# Patient Record
Sex: FEMALE | Race: WHITE | NOT HISPANIC OR LATINO | URBAN - METROPOLITAN AREA
[De-identification: names, ages, dates, MRNs, and addresses within clinical notes are randomized per-mention and may not be internally consistent; named-entity substitution may affect disease eponyms.]

---

## 2017-03-24 ENCOUNTER — HOSPITAL ENCOUNTER (OUTPATIENT)
Dept: SLEEP MEDICINE | Facility: HOSPITAL | Age: 37
Discharge: HOME | End: 2017-03-24
Attending: PSYCHIATRY & NEUROLOGY | Admitting: PSYCHIATRY & NEUROLOGY

## 2020-01-15 ENCOUNTER — APPOINTMENT (OUTPATIENT)
Dept: WOMENS IMAGING | Facility: HOSPITAL | Age: 40
End: 2020-01-15

## 2020-01-15 PROCEDURE — 77067 SCR MAMMO BI INCL CAD: CPT | Performed by: RADIOLOGY

## 2020-01-15 PROCEDURE — 77063 BREAST TOMOSYNTHESIS BI: CPT | Performed by: RADIOLOGY

## 2020-12-02 ENCOUNTER — OFFICE VISIT (OUTPATIENT)
Dept: NEUROLOGY | Facility: CLINIC | Age: 40
End: 2020-12-02

## 2020-12-02 VITALS
TEMPERATURE: 96.8 F | WEIGHT: 293 LBS | DIASTOLIC BLOOD PRESSURE: 86 MMHG | SYSTOLIC BLOOD PRESSURE: 146 MMHG | HEART RATE: 114 BPM | HEIGHT: 65 IN | BODY MASS INDEX: 48.82 KG/M2

## 2020-12-02 DIAGNOSIS — E66.01 SEVERE OBESITY (BMI >= 40) (HCC): ICD-10-CM

## 2020-12-02 DIAGNOSIS — G47.33 OBSTRUCTIVE SLEEP APNEA: Primary | ICD-10-CM

## 2020-12-02 PROBLEM — J45.20 ASTHMA, MILD INTERMITTENT: Status: ACTIVE | Noted: 2020-12-02

## 2020-12-02 PROBLEM — Z83.3 FAMILY HISTORY OF DIABETES MELLITUS: Status: ACTIVE | Noted: 2020-12-02

## 2020-12-02 PROBLEM — R09.02 HYPOXIA: Status: ACTIVE | Noted: 2017-03-28

## 2020-12-02 PROBLEM — F41.9 ANXIETY DISORDER: Status: ACTIVE | Noted: 2020-12-02

## 2020-12-02 PROBLEM — M94.0 ACUTE COSTOCHONDRITIS: Status: ACTIVE | Noted: 2018-11-21

## 2020-12-02 PROBLEM — I10 HYPERTENSION: Status: ACTIVE | Noted: 2020-12-02

## 2020-12-02 PROBLEM — Z97.5 IUD (INTRAUTERINE DEVICE) IN PLACE: Status: ACTIVE | Noted: 2020-12-02

## 2020-12-02 PROBLEM — J30.2 SEASONAL ALLERGIES: Status: ACTIVE | Noted: 2020-12-02

## 2020-12-02 PROCEDURE — 99203 OFFICE O/P NEW LOW 30 MIN: CPT | Performed by: PSYCHIATRY & NEUROLOGY

## 2020-12-02 RX ORDER — ALPRAZOLAM 0.25 MG/1
TABLET ORAL
COMMUNITY
Start: 2020-10-06

## 2020-12-02 RX ORDER — ERGOCALCIFEROL 1.25 MG/1
CAPSULE ORAL
COMMUNITY
Start: 2020-09-18

## 2020-12-02 RX ORDER — BUDESONIDE AND FORMOTEROL FUMARATE DIHYDRATE 160; 4.5 UG/1; UG/1
2 AEROSOL RESPIRATORY (INHALATION)
COMMUNITY
Start: 2020-08-20

## 2020-12-02 RX ORDER — LOSARTAN POTASSIUM AND HYDROCHLOROTHIAZIDE 25; 100 MG/1; MG/1
1 TABLET ORAL DAILY
COMMUNITY
Start: 2017-03-06

## 2020-12-02 RX ORDER — CETIRIZINE HYDROCHLORIDE 10 MG/1
CAPSULE, LIQUID FILLED ORAL
COMMUNITY
Start: 2017-03-06

## 2020-12-02 RX ORDER — ROSUVASTATIN CALCIUM 10 MG/1
10 TABLET, COATED ORAL DAILY
COMMUNITY
Start: 2020-07-27 | End: 2021-07-27

## 2020-12-02 RX ORDER — MONTELUKAST SODIUM 10 MG/1
10 TABLET ORAL DAILY
COMMUNITY
Start: 2017-03-06

## 2020-12-02 RX ORDER — BUPROPION HYDROCHLORIDE 100 MG/1
100 TABLET ORAL 2 TIMES DAILY
COMMUNITY

## 2020-12-02 NOTE — PROGRESS NOTES
Sleep Medicine initial Consultation    Charlotte Mckenzie  : 1980  40 y.o. female   Date of Service: 2020  Referring provider: Self Referring    New sleep, previously seen in 2017    Patient c/o waking up during the night and spouse says she's been doing allot of snoring. Patient been having more fatigue more so in the afternoons.     Patient currently uses a FFM and goes through [employer] for supplies.     Sleep study was done in 2017 HST     On auto cpap 15-20. avg 7 min 100% compliance avg leak 29 min avg pressure 17.3    Obesity, BMI-53.7    There is no history of hypnagogic hallucinations, sleep paralysis or cataplexy.  .  The patient complains of Leg symptoms: leg jerking during sleep.     The patient complains of problems with insomnia:  frequent awakenings 2-3.      Sleep schedule: Bedtime:10-11 , gets out of bed at 7, sleep latency: few seconds, Gets about 6-8 hours of sleep.      EPWORTH SLEEPINESS SCALE  Sitting and reading  1  WatchingTV  1  Sitting, inactive, in a public place  0  As a passenger in a car for 1 hour w/o a break  2  Lying down to rest in the afternoon  2  Sitting and talking to someone  0  Sitting quietly after a lunch  1  In a car, while stopped for traffic or a light  0  Total 7          .  History reviewed. No pertinent past medical history.  History reviewed. No pertinent surgical history.  Current Outpatient Medications on File Prior to Visit   Medication Sig Dispense Refill   • ALPRAZolam (XANAX) 0.25 MG tablet TAKE ONE TABLET BY MOUTH THREE TIMES A DAY AS NEEDED FOR SLEEP OR ANXIETY **MAX DAILY AMOUNT 0.75 MG**     • budesonide-formoterol (SYMBICORT) 160-4.5 MCG/ACT inhaler Inhale 2 puffs.     • buPROPion (WELLBUTRIN) 100 MG tablet Take 100 mg by mouth 2 (Two) Times a Day.     • Cetirizine HCl (ZyrTEC Allergy) 10 MG capsule ZYRTEC ALLERGY 10 MG CAPS     • losartan-hydrochlorothiazide (HYZAAR) 100-25 MG per tablet Take 1 tablet by mouth Daily.     •  metFORMIN (GLUCOPHAGE) 500 MG tablet Take 500 mg by mouth.     • montelukast (SINGULAIR) 10 MG tablet Take 10 mg by mouth Daily.     • rosuvastatin (CRESTOR) 10 MG tablet Take 10 mg by mouth Daily.     • sertraline (Zoloft) 50 MG tablet ZOLOFT 50 MG TABS     • vitamin D (ERGOCALCIFEROL) 1.25 MG (31162 UT) capsule capsule TAKE ONE CAPSULE BY MOUTH ONCE WEEKLY       No current facility-administered medications on file prior to visit.      No Known Allergies  History reviewed. No pertinent family history.  Social History     Socioeconomic History   • Marital status:      Spouse name: Not on file   • Number of children: Not on file   • Years of education: Not on file   • Highest education level: Not on file   Tobacco Use   • Smoking status: Never Smoker   • Smokeless tobacco: Never Used   Substance and Sexual Activity   • Alcohol use: Yes     Comment: socially    • Drug use: Never   • Sexual activity: Yes     Partners: Male     Review of Systems   Constitutional: Positive for fatigue. Negative for appetite change.   HENT: Positive for ear pain. Negative for rhinorrhea and sinus pain.    Eyes: Negative for pain and itching.   Respiratory: Positive for apnea, shortness of breath and wheezing. Negative for cough.    Cardiovascular: Negative for chest pain and palpitations.   Gastrointestinal: Negative for constipation and diarrhea.   Endocrine: Negative for cold intolerance and heat intolerance.   Genitourinary: Positive for frequency. Negative for difficulty urinating.   Musculoskeletal: Positive for back pain. Negative for neck pain.   Allergic/Immunologic: Positive for environmental allergies.   Neurological: Negative for dizziness, tremors, seizures, syncope, facial asymmetry, speech difficulty, weakness, light-headedness, numbness and headaches.   Psychiatric/Behavioral: Negative for agitation and confusion.       I reviewed and addressed ROS entered by MA.    Patient examination:  Vitals:    12/02/20 1633    BP: 146/86   Pulse: 114   Temp: 96.8 °F (36 °C)    Body mass index is 53.72 kg/m².     Physical Exam  Vitals signs reviewed.   Constitutional:       Appearance: Normal appearance.   HENT:      Head: Normocephalic.      Nose: Nose normal. No congestion.      Mouth/Throat:      Mouth: Mucous membranes are moist.   Eyes:      Extraocular Movements: Extraocular movements intact.      Pupils: Pupils are equal, round, and reactive to light.   Cardiovascular:      Rate and Rhythm: Normal rate and regular rhythm.      Pulses: Normal pulses.      Heart sounds: Normal heart sounds. No murmur.   Pulmonary:      Effort: Pulmonary effort is normal. No respiratory distress.      Breath sounds: Normal breath sounds.   Musculoskeletal: Normal range of motion.      Right lower leg: No edema.      Left lower leg: No edema.   Neurological:      General: No focal deficit present.      Mental Status: She is alert and oriented to person, place, and time.      Gait: Gait normal.   Psychiatric:         Mood and Affect: Mood normal.         Behavior: Behavior normal.         ASSESSMENT AND PLAN:    ALIS  --severe alis, adequate control with cpap  ---mask fit for excessive leak    Hypoxia, severe on hst   ---will obtain o2 trend on room air    Obesity  -pt encouraged to lose weight    Encounter Diagnoses   Name Primary?   • Obstructive sleep apnea Yes   • Severe obesity (BMI >= 40) (CMS/HCC)          Return in about 1 year (around 12/2/2021).    This document has been electronically signed by Joseph Seipel, MD  on December 2, 2020 16:56 EST

## 2020-12-03 ENCOUNTER — TELEPHONE (OUTPATIENT)
Dept: NEUROLOGY | Facility: CLINIC | Age: 40
End: 2020-12-03

## 2020-12-03 DIAGNOSIS — G47.33 OBSTRUCTIVE SLEEP APNEA: Primary | ICD-10-CM

## 2020-12-03 NOTE — TELEPHONE ENCOUNTER
----- Message from Joseph F Seipel, MD sent at 12/2/2020  4:57 PM EST -----  [place]    Needs mask fit FFM    Use cpap with new mask for about one week    Then do o2 trend on room air, and detail smart card report for that night and week.

## 2020-12-14 ENCOUNTER — TELEPHONE (OUTPATIENT)
Dept: NEUROLOGY | Facility: CLINIC | Age: 40
End: 2020-12-14

## 2020-12-14 NOTE — TELEPHONE ENCOUNTER
Obtain down load past one week    If no sig air leak and ahi avg over 5 will then increase pressure by 2    Currently 15-20, change to 17-20

## 2020-12-14 NOTE — TELEPHONE ENCOUNTER
----- Message from Edda Branch MA sent at 12/11/2020  3:24 PM EST -----  Regarding: FW: Non-Urgent Medical Question  Contact: [phone]    ----- Message -----  From: Charlotte Mckenzie  Sent: 12/11/2020   9:59 AM EST  To: [facility]  Subject: Non-Urgent Medical Question                      Hi Dr Seipel.   I got a new size cushion and it is 100% fit but now my AHI   has doubled.  Did you mention increasing my starting pressure?  Do you think that would help?     Thank you.

## 2020-12-15 NOTE — TELEPHONE ENCOUNTER
Report ran, Dr Seipel reviewed it, will attempt to order bipap. Due to pt needing higher pressures. May have to titrate if insurance dictates. Called pt no answer. Left message.

## 2020-12-16 ENCOUNTER — TELEPHONE (OUTPATIENT)
Dept: NEUROLOGY | Facility: CLINIC | Age: 40
End: 2020-12-16

## 2020-12-16 DIAGNOSIS — G47.33 OBSTRUCTIVE SLEEP APNEA: Primary | ICD-10-CM

## 2023-07-27 ENCOUNTER — OFFICE VISIT (OUTPATIENT)
Dept: NEUROLOGY | Facility: CLINIC | Age: 43
End: 2023-07-27
Payer: COMMERCIAL

## 2023-07-27 VITALS
SYSTOLIC BLOOD PRESSURE: 124 MMHG | DIASTOLIC BLOOD PRESSURE: 78 MMHG | BODY MASS INDEX: 48.82 KG/M2 | HEART RATE: 80 BPM | WEIGHT: 293 LBS | HEIGHT: 65 IN

## 2023-07-27 DIAGNOSIS — G47.33 OBSTRUCTIVE SLEEP APNEA: Primary | ICD-10-CM

## 2023-07-27 PROCEDURE — 99213 OFFICE O/P EST LOW 20 MIN: CPT | Performed by: PSYCHIATRY & NEUROLOGY

## 2023-07-27 RX ORDER — LEVOTHYROXINE SODIUM 0.05 MG/1
50 TABLET ORAL DAILY
Qty: 30 TABLET | Refills: 11 | COMMUNITY
Start: 2022-11-23 | End: 2023-11-23

## 2023-07-27 RX ORDER — SEMAGLUTIDE 0.68 MG/ML
INJECTION, SOLUTION SUBCUTANEOUS
COMMUNITY
Start: 2023-07-17

## 2023-07-27 NOTE — PROGRESS NOTES
"Chief Complaint  Sleep Apnea    Subjective          Charlotte Mckenzie presents to [facility]  History of Present Illness  Shawn f/u, uses a FFM and goes through [employer] for supplies.   She states she is needing a new mask/head gear.       Sleep testing history:    Sleep study was done in October 2017 HST        PAP download:  The patient is on PAP therapy at 15-23/2-4 cm/H2O.   Data indicates Excellent compliance. With 96% usage for more than 4 hours with an average usage of 6 hours 50 minutes. AHI down to 4.9 .  Average pressures 21/18.  Average large leak 2hr.     The patient's hypersomnia has resolved       [place] Sleepiness Scale:  Sitting and reading 1 WatchingTV 1  Sitting, inactive, in a public place 1  As a passenger in a car for 1 hour w/o a break  2  Lying down to rest in the afternoon  2  Sitting and talking to someone  0  Sitting quietly after a lunch  1  In a car, while stopped for traffic or a light  0  Total 8    Review of Systems   Constitutional:  Positive for fatigue.   Respiratory:  Positive for apnea and wheezing.    Musculoskeletal:  Positive for back pain.   Allergic/Immunologic: Positive for environmental allergies.   Psychiatric/Behavioral:  The patient is nervous/anxious.    All other systems reviewed and are negative.      Objective   Vital Signs:   /78   Pulse 80   Ht 165.1 cm (65\")   Wt (!) 145 kg (320 lb)   BMI 53.25 kg/m²     Physical Exam  Vitals reviewed.   Eyes:      Conjunctiva/sclera: Conjunctivae normal.   Pulmonary:      Effort: Pulmonary effort is normal. No respiratory distress.   Musculoskeletal:      Right lower leg: No edema.      Left lower leg: No edema.   Neurological:      Mental Status: She is alert and oriented to person, place, and time.   Psychiatric:         Mood and Affect: Mood normal.         Behavior: Behavior normal.      Result Review :                 Assessment and Plan    Diagnoses and all orders for this visit:    1. " Obstructive sleep apnea (Primary)  -     PAP Therapy        The patient is compliant with and benefiting from PAP therapy.      Follow Up   Return in about 1 year (around 7/27/2024).    Patient was given instructions and counseling regarding her condition or for health maintenance advice. Please see specific information pulled into the AVS if appropriate.       This document has been electronically signed by Joseph Seipel, MD on July 27, 2023 18:35 EDT